# Patient Record
Sex: MALE | RURAL
[De-identification: names, ages, dates, MRNs, and addresses within clinical notes are randomized per-mention and may not be internally consistent; named-entity substitution may affect disease eponyms.]

---
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
## 2024-01-30 ENCOUNTER — OFFICE VISIT (OUTPATIENT)
Age: 19
End: 2024-01-30
Payer: COMMERCIAL

## 2024-01-30 VITALS
WEIGHT: 231.2 LBS | SYSTOLIC BLOOD PRESSURE: 132 MMHG | OXYGEN SATURATION: 98 % | HEIGHT: 67 IN | BODY MASS INDEX: 36.29 KG/M2 | HEART RATE: 99 BPM | DIASTOLIC BLOOD PRESSURE: 90 MMHG | TEMPERATURE: 98 F | RESPIRATION RATE: 18 BRPM

## 2024-01-30 DIAGNOSIS — F41.9 ANXIETY: ICD-10-CM

## 2024-01-30 DIAGNOSIS — I10 PRIMARY HYPERTENSION: Primary | ICD-10-CM

## 2024-01-30 PROCEDURE — 3075F SYST BP GE 130 - 139MM HG: CPT | Performed by: FAMILY MEDICINE

## 2024-01-30 PROCEDURE — 99214 OFFICE O/P EST MOD 30 MIN: CPT | Performed by: FAMILY MEDICINE

## 2024-01-30 PROCEDURE — 3080F DIAST BP >= 90 MM HG: CPT | Performed by: FAMILY MEDICINE

## 2024-01-30 RX ORDER — METOPROLOL SUCCINATE 25 MG/1
25 TABLET, EXTENDED RELEASE ORAL DAILY
Qty: 30 TABLET | Refills: 3 | Status: SHIPPED | OUTPATIENT
Start: 2024-01-30

## 2024-01-30 RX ORDER — ESCITALOPRAM OXALATE 20 MG/1
20 TABLET ORAL DAILY
Qty: 120 TABLET | Refills: 0 | Status: SHIPPED | OUTPATIENT
Start: 2024-01-30

## 2024-01-30 ASSESSMENT — PATIENT HEALTH QUESTIONNAIRE - PHQ9
2. FEELING DOWN, DEPRESSED OR HOPELESS: 0
SUM OF ALL RESPONSES TO PHQ QUESTIONS 1-9: 0
SUM OF ALL RESPONSES TO PHQ9 QUESTIONS 1 & 2: 0
SUM OF ALL RESPONSES TO PHQ QUESTIONS 1-9: 0
1. LITTLE INTEREST OR PLEASURE IN DOING THINGS: 0

## 2024-01-30 NOTE — PROGRESS NOTES
Identified pt with two pt identifiers(name and ).    Chief Complaint   Patient presents with    Follow-up     Patient is here to follow up on BP, patient is not taking bp at home currently         Health Maintenance Due   Topic    Hepatitis B vaccine (1 of 3 - 3-dose series)    COVID-19 Vaccine (1)    Varicella vaccine (1 of 2 - 2-dose childhood series)    HIV screen     Hepatitis C screen     DTaP/Tdap/Td vaccine (1 - Tdap)    Flu vaccine (1)    HPV vaccine (2 - Male 3-dose series)       Wt Readings from Last 3 Encounters:   23 105.7 kg (233 lb) (98 %, Z= 2.12)*     * Growth percentiles are based on CDC (Boys, 2-20 Years) data.     Temp Readings from Last 3 Encounters:   23 98.2 °F (36.8 °C) (Temporal)     BP Readings from Last 3 Encounters:   23 (!) 139/90     Pulse Readings from Last 3 Encounters:   23 93           Depression Screening:  :         2024    10:38 AM 2023    10:29 AM   PHQ-9 Questionaire   Little interest or pleasure in doing things 0 0   Feeling down, depressed, or hopeless 0 0   PHQ-9 Total Score 0 0        Fall Risk Assessment:  :          No data to display                 Abuse Screening:  :          No data to display                 Coordination of Care Questionnaire:  :     \"Have you been to the ER, urgent care clinic since your last visit?  Hospitalized since your last visit?\"    NO    “Have you seen or consulted any other health care providers outside of [facility] since your last visit?”    NO

## 2024-02-01 ASSESSMENT — ENCOUNTER SYMPTOMS
COUGH: 0
VOMITING: 0
CHEST TIGHTNESS: 0
DIARRHEA: 0
SHORTNESS OF BREATH: 0
ALLERGIC/IMMUNOLOGIC NEGATIVE: 1
EYES NEGATIVE: 1
CONSTIPATION: 0
NAUSEA: 0

## 2024-02-02 NOTE — PROGRESS NOTES
HISTORY AND PHYSICAL                   Patient Name: Brett Day     YOB: 2005      Age:  18 y.o.    Chief Complaint     Chief Complaint   Patient presents with    Follow-up     Patient is here to follow up on BP, patient is not taking bp at home currently       History Obtained From   patient    History of Present Illness   Brett Day is a 18 y.o. male presents to [facility], has a history of a estrogen blocker implant, which he would like removed and is on testosterone at the moment. Patient states that he needs refills of testosterone and for metformin. We discuss that this is a endocrine specialty that we would need notes from his previous providers.     Patient presents today states that he has not been checking his blood pressure at home he understands that the hormone therapy that he is on will persist to increase his blood pressure.  He has decided that he will try to follow a anti-inflammatory diet, that is lower in carbohydrates and processed foods and diet sodas and higher in increased fluid intake fruits and vegetables leaner meats and less processed food processed foods.  Patient also states that he will try to increase his exercise to 30 to 60 minutes.  With the high blood pressure the patient denies any history of chest pain chest pressure shortness of breath nausea vomiting swelling of the legs or any additional symptoms.      Past Medical History   No past medical history on file.     Past Surgical History   No past surgical history on file.     Medications Prior to Admission     Prior to Admission medications    Medication Sig Start Date End Date Taking? Authorizing Provider   escitalopram (LEXAPRO) 20 MG tablet Take 1 tablet by mouth daily 1/30/24  Yes Brenda Cho MD   metoprolol succinate (TOPROL XL) 25 MG extended release tablet Take 1 tablet by mouth daily 1/30/24  Yes Brenda Cho MD   testosterone cypionate (DEPOTESTOTERONE CYPIONATE) 200 MG/ML injection

## 2024-04-15 ENCOUNTER — OFFICE VISIT (OUTPATIENT)
Age: 19
End: 2024-04-15
Payer: MEDICARE

## 2024-04-15 VITALS
HEIGHT: 67 IN | TEMPERATURE: 99.2 F | SYSTOLIC BLOOD PRESSURE: 107 MMHG | DIASTOLIC BLOOD PRESSURE: 69 MMHG | OXYGEN SATURATION: 100 % | HEART RATE: 69 BPM | WEIGHT: 235 LBS | BODY MASS INDEX: 36.88 KG/M2 | RESPIRATION RATE: 18 BRPM

## 2024-04-15 DIAGNOSIS — F64.0 GENDER DYSPHORIA IN ADULT: Primary | ICD-10-CM

## 2024-04-15 PROCEDURE — 99204 OFFICE O/P NEW MOD 45 MIN: CPT | Performed by: INTERNAL MEDICINE

## 2024-04-15 NOTE — PROGRESS NOTES
Endocrine Consultation    PCP: Brenda Cho MD    Chief Complaint   Patient presents with    New Patient     Referred for Testosterone mgmt     HPI:  Brett Day is a 18 y.o. adult with  has a past medical history of Type 2 diabetes mellitus (HCC). who is seen in consultation at the request of Brenda Cho MD for evaluation of hormone therapy.     - sex assigned at birth: female   - felt as if he always knew identified as male   - changed name legally approx 2019  - started testosterone approx 2020   - currently on testosterone 100 mg IM q2 weeks   - is happy is with changes experienced  - s/p double mastectomy Oct 2022  - prior endocrinology in [facility]   - previously on estrogen blocker (puberty blocker), no longer on this   - no vaginal bleeding   - significant facial hair, happy with results  - mood is good  - prior evaluations with psychiatry, with written letters/approval for medical+surgical mgmt of gender dysphoria     Full ROS completed this visit:  Negative for weight gain, weight loss, fevers, chills, blurry vision, changes in vision, chest pain, palpitations, leg swelling, shortness of breath, wheezing, snoring, abdominal pain, nausea, vomiting, diarrhea, constipation, frequent urination, dysuria, tremors, fainting, shakes, cold intolerance, hot intolerance, depression, anxiety, foot sores, rash.    LABS/STUDIES:     No results found for: \"QHZ7UVUC\"    Lab Results   Component Value Date/Time    LABA1C 5.9 12/22/2023 11:33 AM    CREATININE 0.78 12/22/2023 11:33 AM    LABGLOM >60 12/22/2023 11:33 AM       No results found for: \"CHOL\", \"TRIG\", \"HDL\", \"LDLCALC\"    No results found for: \"TSH\"    No results found for: \"CPEPLT\", \"CPEPTIDE\"    Current Outpatient Medications   Medication Sig Dispense Refill    escitalopram (LEXAPRO) 20 MG tablet Take 1 tablet by mouth daily 120 tablet 0    metoprolol succinate (TOPROL XL) 25 MG extended release tablet Take 1 tablet by mouth daily 30

## 2024-04-18 LAB
BASOPHILS # BLD AUTO: 0 X10E3/UL (ref 0–0.2)
BASOPHILS NFR BLD AUTO: 1 %
EOSINOPHIL # BLD AUTO: 0.1 X10E3/UL (ref 0–0.4)
EOSINOPHIL NFR BLD AUTO: 2 %
ERYTHROCYTE [DISTWIDTH] IN BLOOD BY AUTOMATED COUNT: 12.6 % (ref 11.6–15.4)
HCT VFR BLD AUTO: 48.9 % (ref 37.5–51)
HGB BLD-MCNC: 15.8 G/DL (ref 13–17.7)
IMM GRANULOCYTES # BLD AUTO: 0 X10E3/UL (ref 0–0.1)
IMM GRANULOCYTES NFR BLD AUTO: 0 %
LYMPHOCYTES # BLD AUTO: 3 X10E3/UL (ref 0.7–3.1)
LYMPHOCYTES NFR BLD AUTO: 35 %
MCH RBC QN AUTO: 28.3 PG (ref 26.6–33)
MCHC RBC AUTO-ENTMCNC: 32.3 G/DL (ref 31.5–35.7)
MCV RBC AUTO: 88 FL (ref 79–97)
MONOCYTES # BLD AUTO: 0.4 X10E3/UL (ref 0.1–0.9)
MONOCYTES NFR BLD AUTO: 5 %
NEUTROPHILS # BLD AUTO: 4.9 X10E3/UL (ref 1.4–7)
NEUTROPHILS NFR BLD AUTO: 57 %
PLATELET # BLD AUTO: 320 X10E3/UL (ref 150–450)
RBC # BLD AUTO: 5.58 X10E6/UL (ref 4.14–5.8)
TESTOST SERPL-MCNC: 612 NG/DL (ref 150–785)
WBC # BLD AUTO: 8.5 X10E3/UL (ref 3.4–10.8)

## 2024-05-09 ENCOUNTER — OFFICE VISIT (OUTPATIENT)
Age: 19
End: 2024-05-09
Payer: COMMERCIAL

## 2024-05-09 VITALS
WEIGHT: 238.4 LBS | SYSTOLIC BLOOD PRESSURE: 130 MMHG | HEART RATE: 87 BPM | TEMPERATURE: 97.5 F | OXYGEN SATURATION: 98 % | BODY MASS INDEX: 37.42 KG/M2 | DIASTOLIC BLOOD PRESSURE: 77 MMHG | HEIGHT: 67 IN

## 2024-05-09 DIAGNOSIS — F64.0 GENDER DYSPHORIA IN ADULT: Primary | ICD-10-CM

## 2024-05-09 PROCEDURE — 99214 OFFICE O/P EST MOD 30 MIN: CPT | Performed by: INTERNAL MEDICINE

## 2024-05-09 RX ORDER — TESTOSTERONE CYPIONATE 200 MG/ML
VIAL (ML) INTRAMUSCULAR
Qty: 1.96 ML | Refills: 1 | Status: SHIPPED | OUTPATIENT
Start: 2024-05-09

## 2024-05-09 RX ORDER — SYRINGE W-NEEDLE,DISPOSAB,3 ML 25GX5/8"
SYRINGE, EMPTY DISPOSABLE MISCELLANEOUS
Qty: 6 EACH | Refills: 3 | Status: SHIPPED | OUTPATIENT
Start: 2024-05-09

## 2024-05-09 NOTE — PROGRESS NOTES
Brett Day is a 19 y.o. adult here for   Chief Complaint   Patient presents with    Gender Dysphoria in Adult       1. Have you been to the ER, urgent care clinic since your last visit?  Hospitalized since your last visit? -NO    2. Have you seen or consulted any other health care providers outside of the [facility] since your last visit?  Include any pap smears or colon screening.-NO      
testosterone can be measured 24 - 48 h after injection. Trough levels can be measured immediately before injection.  Monitor hematocrit and lipid profile before starting hormones and at follow-up visits.  FTM patients with cervixes should be screened appropriately.    Discussed with patient: unless any ordered testing results are urgent, they will be reviewed at scheduled follow up visit.     Return in about 6 months (around 11/9/2024).    Thank you for allowing me to participate in the care of this patient.

## 2024-05-13 DIAGNOSIS — F64.0 GENDER DYSPHORIA IN ADULT: ICD-10-CM

## 2024-05-28 ENCOUNTER — PATIENT MESSAGE (OUTPATIENT)
Age: 19
End: 2024-05-28

## 2024-05-28 DIAGNOSIS — F64.0 GENDER DYSPHORIA IN ADULT: Primary | ICD-10-CM

## 2024-05-29 RX ORDER — TESTOSTERONE CYPIONATE 1000 MG/10ML
100 INJECTION, SOLUTION INTRAMUSCULAR
Qty: 6 ML | Refills: 0 | Status: SHIPPED | OUTPATIENT
Start: 2024-05-29 | End: 2024-08-27

## 2024-05-29 NOTE — TELEPHONE ENCOUNTER
From: Brett Day  To: Dr. Kelsey Murcia  Sent: 5/28/2024 8:54 PM EDT  Subject: Testosterone Prescription    Hello, I filled my testosterone, and I realized I need 2 separate 200mg per month because they are single dose vials. And also 18g 3ml syringe, picture attached. I probably should have had a written list last time I went in.     Thank you, Brett.

## 2024-09-09 DIAGNOSIS — F64.0 GENDER DYSPHORIA IN ADULT: ICD-10-CM

## 2024-09-09 RX ORDER — TESTOSTERONE CYPIONATE 1000 MG/10ML
INJECTION, SOLUTION INTRAMUSCULAR
Qty: 10 ML | Refills: 3 | OUTPATIENT
Start: 2024-09-09

## 2024-10-31 LAB
ALBUMIN SERPL-MCNC: 4.3 G/DL (ref 4.3–5.2)
ALP SERPL-CCNC: 105 IU/L (ref 51–125)
ALT SERPL-CCNC: 59 IU/L (ref 0–44)
AST SERPL-CCNC: 29 IU/L (ref 0–40)
BASOPHILS # BLD AUTO: 0 X10E3/UL (ref 0–0.2)
BASOPHILS NFR BLD AUTO: 1 %
BILIRUB SERPL-MCNC: 0.3 MG/DL (ref 0–1.2)
BUN SERPL-MCNC: 15 MG/DL (ref 6–20)
BUN/CREAT SERPL: 21 (ref 9–20)
CALCIUM SERPL-MCNC: 9.6 MG/DL (ref 8.7–10.2)
CHLORIDE SERPL-SCNC: 103 MMOL/L (ref 96–106)
CO2 SERPL-SCNC: 23 MMOL/L (ref 20–29)
CREAT SERPL-MCNC: 0.7 MG/DL (ref 0.76–1.27)
EGFRCR SERPLBLD CKD-EPI 2021: 135 ML/MIN/1.73
EOSINOPHIL # BLD AUTO: 0.2 X10E3/UL (ref 0–0.4)
EOSINOPHIL NFR BLD AUTO: 2 %
ERYTHROCYTE [DISTWIDTH] IN BLOOD BY AUTOMATED COUNT: 12.7 % (ref 11.6–15.4)
GLOBULIN SER CALC-MCNC: 2.5 G/DL (ref 1.5–4.5)
GLUCOSE SERPL-MCNC: 132 MG/DL (ref 70–99)
HCT VFR BLD AUTO: 44 % (ref 37.5–51)
HGB BLD-MCNC: 14.6 G/DL (ref 13–17.7)
IMM GRANULOCYTES # BLD AUTO: 0 X10E3/UL (ref 0–0.1)
IMM GRANULOCYTES NFR BLD AUTO: 0 %
LYMPHOCYTES # BLD AUTO: 3.1 X10E3/UL (ref 0.7–3.1)
LYMPHOCYTES NFR BLD AUTO: 40 %
MCH RBC QN AUTO: 29.5 PG (ref 26.6–33)
MCHC RBC AUTO-ENTMCNC: 33.2 G/DL (ref 31.5–35.7)
MCV RBC AUTO: 89 FL (ref 79–97)
MONOCYTES # BLD AUTO: 0.4 X10E3/UL (ref 0.1–0.9)
MONOCYTES NFR BLD AUTO: 5 %
NEUTROPHILS # BLD AUTO: 4 X10E3/UL (ref 1.4–7)
NEUTROPHILS NFR BLD AUTO: 52 %
PLATELET # BLD AUTO: 316 X10E3/UL (ref 150–450)
POTASSIUM SERPL-SCNC: 4.2 MMOL/L (ref 3.5–5.2)
PROT SERPL-MCNC: 6.8 G/DL (ref 6–8.5)
RBC # BLD AUTO: 4.95 X10E6/UL (ref 4.14–5.8)
SODIUM SERPL-SCNC: 142 MMOL/L (ref 134–144)
TESTOST SERPL-MCNC: 14 NG/DL (ref 264–916)
WBC # BLD AUTO: 7.8 X10E3/UL (ref 3.4–10.8)

## 2024-11-07 ENCOUNTER — OFFICE VISIT (OUTPATIENT)
Age: 19
End: 2024-11-07
Payer: COMMERCIAL

## 2024-11-07 VITALS
BODY MASS INDEX: 37.48 KG/M2 | HEART RATE: 100 BPM | SYSTOLIC BLOOD PRESSURE: 127 MMHG | TEMPERATURE: 98.7 F | WEIGHT: 238.8 LBS | OXYGEN SATURATION: 95 % | HEIGHT: 67 IN | DIASTOLIC BLOOD PRESSURE: 77 MMHG

## 2024-11-07 DIAGNOSIS — R74.01 TRANSAMINITIS: ICD-10-CM

## 2024-11-07 DIAGNOSIS — F64.0 GENDER DYSPHORIA IN ADULT: Primary | ICD-10-CM

## 2024-11-07 PROCEDURE — 99214 OFFICE O/P EST MOD 30 MIN: CPT | Performed by: INTERNAL MEDICINE

## 2024-11-07 RX ORDER — TESTOSTERONE CYPIONATE 1000 MG/10ML
100 INJECTION, SOLUTION INTRAMUSCULAR
Qty: 6 ML | Refills: 0 | Status: SHIPPED | OUTPATIENT
Start: 2024-11-07 | End: 2025-02-05

## 2024-11-07 NOTE — PROGRESS NOTES
Follow up     PCP: Brenda Cho MD    Chief Complaint   Patient presents with    Gender Dysphoria in adult     HPI:  Brett Day is a 19 y.o. adult with  has a past medical history of Type 2 diabetes mellitus (HCC). Here for follow up      - sex assigned at birth: female   - felt as if he always knew identified as male   - changed name legally approx 2019  - started testosterone approx 2020   - currently on testosterone 100 mg IM q2 weeks   - is happy is with changes experienced  - s/p double mastectomy Oct 2022  - prior endocrinology in [facility]   - previously on estrogen blocker (puberty blocker), no longer on this   - no vaginal bleeding   - significant facial hair, happy with results  - mood is good  - prior evaluations with psychiatry, with written letters/approval for medical+surgical mgmt of gender dysphoria   - labs reviewed today    BRIEF ROS   Gen: no fevers/chills  CV: no chest pain  Resp: no shortness of breath, cough   GI: no nausea, emesis, abdominal pain  Neuro: no confusion     LABS/STUDIES:     No results found for: \"PHO8QOBP\"    Lab Results   Component Value Date/Time    LABA1C 5.9 12/22/2023 11:33 AM    CREATININE 0.70 10/30/2024 10:02 AM    LABGLOM 135 10/30/2024 10:02 AM    LABGLOM >60 12/22/2023 11:33 AM     No results found for: \"CHOL\", \"TRIG\", \"HDL\"  No results found for: \"TSH\"  No results found for: \"CPEPLT\", \"CPEPTIDE\"    Current Outpatient Medications   Medication Sig Dispense Refill    testosterone cypionate (DEPOTESTOTERONE CYPIONATE) 100 MG/ML injection Inject 1 mL into the muscle every 14 days for 90 days. Stop 200mg/ml Max Daily Amount: 100 mg 6 mL 0    SYRINGE-NEEDLE, DISP, 3 ML 18G X 1-1/2\" 3 ML MISC Use to inject testosterone every 2 weeks DX F64.0 6 each 3    NEEDLE, DISP, 23 G 23G X 1\" MISC To use as directed 12 each 5    escitalopram (LEXAPRO) 20 MG tablet Take 1 tablet by mouth daily 120 tablet 0    metoprolol succinate (TOPROL XL) 25 MG extended
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
## 2024-12-06 DIAGNOSIS — F64.0 GENDER DYSPHORIA IN ADULT: ICD-10-CM

## 2024-12-13 ENCOUNTER — TELEPHONE (OUTPATIENT)
Age: 19
End: 2024-12-13

## 2024-12-13 DIAGNOSIS — F64.0 GENDER DYSPHORIA IN ADULT: Primary | ICD-10-CM

## 2024-12-13 NOTE — TELEPHONE ENCOUNTER
Please contact pharmacy regarding script for testosterone sent back in October stating vials do not come in dosage sent. They are asking for 10ml with 2 refills to last the 28days once vial is punctured.

## 2024-12-13 NOTE — TELEPHONE ENCOUNTER
I called and spoke to the pharmacist at [facility] and he stated that rx for the testosterone was sent for 100mg/ml. Pharmacist stated that 100mg only comes in 10ml vial. The pt only take 1ml every 2 weeks which means he's only taking 2ml a month and will be wasting 8ml every month. Pharmacist requesting order be changes to 200mh/ml which have 1ml vial.

## 2024-12-16 RX ORDER — TESTOSTERONE CYPIONATE 200 MG/ML
100 INJECTION, SOLUTION INTRAMUSCULAR
Qty: 3 ML | Refills: 0 | Status: SHIPPED | OUTPATIENT
Start: 2024-12-16 | End: 2025-03-16

## 2025-03-03 DIAGNOSIS — F64.0 GENDER DYSPHORIA IN ADULT: ICD-10-CM

## 2025-03-03 RX ORDER — TESTOSTERONE CYPIONATE 200 MG/ML
INJECTION, SOLUTION INTRAMUSCULAR
Qty: 3 ML | Refills: 0 | OUTPATIENT
Start: 2025-03-03

## 2025-03-03 RX ORDER — TESTOSTERONE CYPIONATE 200 MG/ML
100 INJECTION, SOLUTION INTRAMUSCULAR
Qty: 3 ML | Refills: 0 | Status: SHIPPED | OUTPATIENT
Start: 2025-03-03 | End: 2025-06-01

## 2025-05-01 LAB
ALBUMIN SERPL-MCNC: 4.3 G/DL (ref 4.3–5.2)
ALP SERPL-CCNC: 105 IU/L (ref 44–121)
ALT SERPL-CCNC: 51 IU/L (ref 0–44)
AST SERPL-CCNC: 23 IU/L (ref 0–40)
BASOPHILS # BLD AUTO: 0 X10E3/UL (ref 0–0.2)
BASOPHILS NFR BLD AUTO: 0 %
BILIRUB SERPL-MCNC: 0.5 MG/DL (ref 0–1.2)
BUN SERPL-MCNC: 15 MG/DL (ref 6–20)
BUN/CREAT SERPL: 18 (ref 9–20)
CALCIUM SERPL-MCNC: 9.6 MG/DL (ref 8.7–10.2)
CHLORIDE SERPL-SCNC: 103 MMOL/L (ref 96–106)
CO2 SERPL-SCNC: 23 MMOL/L (ref 20–29)
CREAT SERPL-MCNC: 0.82 MG/DL (ref 0.76–1.27)
EGFRCR SERPLBLD CKD-EPI 2021: 128 ML/MIN/1.73
EOSINOPHIL # BLD AUTO: 0.1 X10E3/UL (ref 0–0.4)
EOSINOPHIL NFR BLD AUTO: 1 %
ERYTHROCYTE [DISTWIDTH] IN BLOOD BY AUTOMATED COUNT: 12.8 % (ref 11.6–15.4)
GLOBULIN SER CALC-MCNC: 2.6 G/DL (ref 1.5–4.5)
GLUCOSE SERPL-MCNC: 172 MG/DL (ref 70–99)
HCT VFR BLD AUTO: 45.1 % (ref 37.5–51)
HGB BLD-MCNC: 15.1 G/DL (ref 13–17.7)
IMM GRANULOCYTES # BLD AUTO: 0 X10E3/UL (ref 0–0.1)
IMM GRANULOCYTES NFR BLD AUTO: 0 %
LYMPHOCYTES # BLD AUTO: 2.6 X10E3/UL (ref 0.7–3.1)
LYMPHOCYTES NFR BLD AUTO: 33 %
MCH RBC QN AUTO: 29.3 PG (ref 26.6–33)
MCHC RBC AUTO-ENTMCNC: 33.5 G/DL (ref 31.5–35.7)
MCV RBC AUTO: 88 FL (ref 79–97)
MONOCYTES # BLD AUTO: 0.4 X10E3/UL (ref 0.1–0.9)
MONOCYTES NFR BLD AUTO: 5 %
NEUTROPHILS # BLD AUTO: 4.9 X10E3/UL (ref 1.4–7)
NEUTROPHILS NFR BLD AUTO: 61 %
PLATELET # BLD AUTO: 319 X10E3/UL (ref 150–450)
POTASSIUM SERPL-SCNC: 4.1 MMOL/L (ref 3.5–5.2)
PROT SERPL-MCNC: 6.9 G/DL (ref 6–8.5)
RBC # BLD AUTO: 5.15 X10E6/UL (ref 4.14–5.8)
SODIUM SERPL-SCNC: 140 MMOL/L (ref 134–144)
WBC # BLD AUTO: 8 X10E3/UL (ref 3.4–10.8)

## 2025-05-07 ENCOUNTER — OFFICE VISIT (OUTPATIENT)
Age: 20
End: 2025-05-07
Payer: COMMERCIAL

## 2025-05-07 VITALS
BODY MASS INDEX: 38.14 KG/M2 | HEIGHT: 67 IN | TEMPERATURE: 97.9 F | SYSTOLIC BLOOD PRESSURE: 131 MMHG | OXYGEN SATURATION: 95 % | WEIGHT: 243 LBS | HEART RATE: 90 BPM | DIASTOLIC BLOOD PRESSURE: 87 MMHG

## 2025-05-07 DIAGNOSIS — R73.03 PRE-DIABETES: ICD-10-CM

## 2025-05-07 DIAGNOSIS — R74.01 ELEVATED ALT MEASUREMENT: ICD-10-CM

## 2025-05-07 DIAGNOSIS — Z78.9 TRANSGENDER MALE: Primary | ICD-10-CM

## 2025-05-07 PROCEDURE — 99214 OFFICE O/P EST MOD 30 MIN: CPT | Performed by: INTERNAL MEDICINE

## 2025-05-07 NOTE — PROGRESS NOTES
Follow up     PCP: Brenda Cho MD    Chief Complaint   Patient presents with    Hormone Therapy     HPI:  Brett Day is a 20 y.o. adult with  has a past medical history of Type 2 diabetes mellitus (HCC). Here for follow up      - sex assigned at birth: female   - felt as if he always knew identified as male   - changed name legally approx 2019  - started testosterone approx 2020   - is happy is with changes experienced  - s/p double mastectomy Oct 2022  - prior endocrinology in [facility]   - previously on estrogen blocker (puberty blocker), no longer on this   - prior evaluations with psychiatry, with written letters/approval for medical+surgical mgmt of gender dysphoria   - happy with changes  - doing well today   - labs reviewed today  - currently on testosterone 100 mg IM q2 weeks   - no vaginal bleeding   - significant facial hair, happy with results  - no galactorrhea     BRIEF ROS   Gen: no fevers/chills  CV: no chest pain  Resp: no shortness of breath, cough   GI: no nausea, emesis, abdominal pain  Neuro: no confusion     LABS/STUDIES:     No results found for: \"UQY4NCQI\"    Lab Results   Component Value Date/Time    LABA1C 5.9 12/22/2023 11:33 AM    CREATININE 0.82 04/30/2025 11:46 AM    LABGLOM 128 04/30/2025 11:46 AM    LABGLOM >60 12/22/2023 11:33 AM     No results found for: \"CHOL\", \"TRIG\", \"HDL\"  No results found for: \"TSH\"  No results found for: \"CPEPLT\", \"CPEPTIDE\"    Current Outpatient Medications   Medication Sig Dispense Refill    testosterone cypionate (DEPOTESTOTERONE CYPIONATE) 200 MG/ML injection Inject 0.5 mLs into the muscle every 14 days for 90 days. Max Daily Amount: 100 mg 3 mL 0    NEEDLE, DISP, 23 G 23G X 1\" MISC To use as directed 12 each 5    SYRINGE-NEEDLE, DISP, 3 ML 18G X 1-1/2\" 3 ML MISC Use to inject testosterone every 2 weeks DX F64.0 6 each 3    escitalopram (LEXAPRO) 20 MG tablet Take 1 tablet by mouth daily 120 tablet 0    metoprolol succinate

## 2025-05-07 NOTE — PROGRESS NOTES
Brett Day is a 20 y.o. adult here for   Chief Complaint   Patient presents with    Hormone Therapy       1. Have you been to the ER, urgent care clinic since your last visit?  Hospitalized since your last visit? -no    2. Have you seen or consulted any other health care providers outside of the [facility] since your last visit?  Include any pap smears or colon screening.-no

## 2025-05-19 ENCOUNTER — LAB (OUTPATIENT)
Age: 20
End: 2025-05-19

## 2025-05-19 DIAGNOSIS — R74.01 TRANSAMINITIS: ICD-10-CM

## 2025-05-19 DIAGNOSIS — Z78.9 TRANSGENDER MALE: ICD-10-CM

## 2025-05-20 LAB
ALBUMIN SERPL-MCNC: 3.7 G/DL (ref 3.5–5)
ALBUMIN/GLOB SERPL: 1 (ref 1.1–2.2)
ALP SERPL-CCNC: 100 U/L (ref 45–117)
ALT SERPL-CCNC: 58 U/L (ref 12–78)
AST SERPL-CCNC: 24 U/L (ref 15–37)
BILIRUB DIRECT SERPL-MCNC: 0.1 MG/DL (ref 0–0.2)
BILIRUB SERPL-MCNC: 0.4 MG/DL (ref 0.2–1)
GLOBULIN SER CALC-MCNC: 3.6 G/DL (ref 2–4)
PROT SERPL-MCNC: 7.3 G/DL (ref 6.4–8.2)

## 2025-05-21 LAB — TESTOST SERPL-MCNC: 195 NG/DL (ref 264–916)

## 2025-05-23 ENCOUNTER — RESULTS FOLLOW-UP (OUTPATIENT)
Age: 20
End: 2025-05-23

## 2025-05-23 DIAGNOSIS — F64.0 GENDER DYSPHORIA IN ADULT: ICD-10-CM

## 2025-05-23 RX ORDER — TESTOSTERONE CYPIONATE 200 MG/ML
100 INJECTION, SOLUTION INTRAMUSCULAR
Qty: 3 ML | Refills: 0 | Status: SHIPPED | OUTPATIENT
Start: 2025-05-23 | End: 2025-08-21

## 2025-06-27 RX ORDER — TESTOSTERONE CYPIONATE 200 MG/ML
150 INJECTION, SOLUTION INTRAMUSCULAR
Qty: 4.5 ML | Refills: 0 | Status: SHIPPED | OUTPATIENT
Start: 2025-06-27 | End: 2025-09-25